# Patient Record
Sex: FEMALE | Race: WHITE | NOT HISPANIC OR LATINO | URBAN - METROPOLITAN AREA
[De-identification: names, ages, dates, MRNs, and addresses within clinical notes are randomized per-mention and may not be internally consistent; named-entity substitution may affect disease eponyms.]

---

## 2018-01-01 ENCOUNTER — HOSPITAL ENCOUNTER (INPATIENT)
Facility: MEDICAL CENTER | Age: 73
LOS: 2 days | DRG: 442 | End: 2018-04-29
Attending: EMERGENCY MEDICINE | Admitting: HOSPITALIST
Payer: MEDICARE

## 2018-01-01 ENCOUNTER — APPOINTMENT (OUTPATIENT)
Dept: RADIOLOGY | Facility: MEDICAL CENTER | Age: 73
DRG: 442 | End: 2018-01-01
Attending: EMERGENCY MEDICINE
Payer: MEDICARE

## 2018-01-01 VITALS
BODY MASS INDEX: 26.68 KG/M2 | TEMPERATURE: 97.2 F | HEART RATE: 64 BPM | RESPIRATION RATE: 22 BRPM | SYSTOLIC BLOOD PRESSURE: 75 MMHG | HEIGHT: 66 IN | OXYGEN SATURATION: 97 % | WEIGHT: 166.01 LBS | DIASTOLIC BLOOD PRESSURE: 47 MMHG

## 2018-01-01 DIAGNOSIS — N17.9 ACUTE RENAL FAILURE, UNSPECIFIED ACUTE RENAL FAILURE TYPE (HCC): ICD-10-CM

## 2018-01-01 DIAGNOSIS — R17 JAUNDICE: ICD-10-CM

## 2018-01-01 LAB
AFP-TM SERPL-MCNC: 4 NG/ML (ref 0–9)
ALBUMIN SERPL BCP-MCNC: 1.5 G/DL (ref 3.2–4.9)
ALBUMIN SERPL BCP-MCNC: 1.5 G/DL (ref 3.2–4.9)
ALBUMIN SERPL BCP-MCNC: 1.6 G/DL (ref 3.2–4.9)
ALBUMIN/GLOB SERPL: 0.3 G/DL
ALP SERPL-CCNC: 485 U/L (ref 30–99)
ALP SERPL-CCNC: 498 U/L (ref 30–99)
ALP SERPL-CCNC: 548 U/L (ref 30–99)
ALT SERPL-CCNC: 53 U/L (ref 2–50)
ALT SERPL-CCNC: 63 U/L (ref 2–50)
ALT SERPL-CCNC: 74 U/L (ref 2–50)
ANION GAP SERPL CALC-SCNC: 20 MMOL/L (ref 0–11.9)
APPEARANCE UR: ABNORMAL
AST SERPL-CCNC: 138 U/L (ref 12–45)
AST SERPL-CCNC: 151 U/L (ref 12–45)
AST SERPL-CCNC: 185 U/L (ref 12–45)
BACTERIA #/AREA URNS HPF: ABNORMAL /HPF
BASOPHILS # BLD AUTO: 0.1 % (ref 0–1.8)
BASOPHILS # BLD AUTO: 0.2 % (ref 0–1.8)
BASOPHILS # BLD AUTO: 0.2 % (ref 0–1.8)
BASOPHILS # BLD: 0.03 K/UL (ref 0–0.12)
BASOPHILS # BLD: 0.04 K/UL (ref 0–0.12)
BASOPHILS # BLD: 0.06 K/UL (ref 0–0.12)
BILIRUB SERPL-MCNC: 25.7 MG/DL (ref 0.1–1.5)
BILIRUB SERPL-MCNC: 27.2 MG/DL (ref 0.1–1.5)
BILIRUB SERPL-MCNC: 27.5 MG/DL (ref 0.1–1.5)
BUN SERPL-MCNC: 102 MG/DL (ref 8–22)
BUN SERPL-MCNC: 79 MG/DL (ref 8–22)
BUN SERPL-MCNC: 87 MG/DL (ref 8–22)
CALCIUM SERPL-MCNC: 7.1 MG/DL (ref 8.4–10.2)
CALCIUM SERPL-MCNC: 7.2 MG/DL (ref 8.4–10.2)
CALCIUM SERPL-MCNC: 7.9 MG/DL (ref 8.4–10.2)
CASTS 1761B: ABNORMAL /LPF
CASTS URNS QL MICRO: ABNORMAL /LPF
CHLORIDE SERPL-SCNC: 87 MMOL/L (ref 96–112)
CHLORIDE SERPL-SCNC: 89 MMOL/L (ref 96–112)
CHLORIDE SERPL-SCNC: 90 MMOL/L (ref 96–112)
CO2 SERPL-SCNC: 11 MMOL/L (ref 20–33)
CO2 SERPL-SCNC: 11 MMOL/L (ref 20–33)
CO2 SERPL-SCNC: 12 MMOL/L (ref 20–33)
COLOR UR: ABNORMAL
CREAT SERPL-MCNC: 6.25 MG/DL (ref 0.5–1.4)
CREAT SERPL-MCNC: 6.92 MG/DL (ref 0.5–1.4)
CREAT SERPL-MCNC: 7.96 MG/DL (ref 0.5–1.4)
CREAT UR-MCNC: 192.8 MG/DL
CRYSTALS 1718B: ABNORMAL /HPF
EKG IMPRESSION: NORMAL
EOSINOPHIL # BLD AUTO: 0.01 K/UL (ref 0–0.51)
EOSINOPHIL # BLD AUTO: 0.02 K/UL (ref 0–0.51)
EOSINOPHIL # BLD AUTO: 0.03 K/UL (ref 0–0.51)
EOSINOPHIL NFR BLD: 0 % (ref 0–6.9)
EOSINOPHIL NFR BLD: 0.1 % (ref 0–6.9)
EOSINOPHIL NFR BLD: 0.1 % (ref 0–6.9)
EPI CELLS #/AREA URNS HPF: ABNORMAL /HPF
ERYTHROCYTE [DISTWIDTH] IN BLOOD BY AUTOMATED COUNT: 51.8 FL (ref 35.9–50)
ERYTHROCYTE [DISTWIDTH] IN BLOOD BY AUTOMATED COUNT: 53.3 FL (ref 35.9–50)
ERYTHROCYTE [DISTWIDTH] IN BLOOD BY AUTOMATED COUNT: 54 FL (ref 35.9–50)
ETHANOL BLD-MCNC: 0 G/DL
GLOBULIN SER CALC-MCNC: 4.4 G/DL (ref 1.9–3.5)
GLOBULIN SER CALC-MCNC: 4.6 G/DL (ref 1.9–3.5)
GLOBULIN SER CALC-MCNC: 4.9 G/DL (ref 1.9–3.5)
GLUCOSE SERPL-MCNC: 137 MG/DL (ref 65–99)
GLUCOSE SERPL-MCNC: 168 MG/DL (ref 65–99)
GLUCOSE SERPL-MCNC: 173 MG/DL (ref 65–99)
HAV IGM SERPL QL IA: NEGATIVE
HBV CORE IGM SER QL: NEGATIVE
HBV SURFACE AG SER QL: NEGATIVE
HCT VFR BLD AUTO: 42.1 % (ref 37–47)
HCT VFR BLD AUTO: 43.2 % (ref 37–47)
HCT VFR BLD AUTO: 43.9 % (ref 37–47)
HCV AB SER QL: NEGATIVE
HGB BLD-MCNC: 14.6 G/DL (ref 12–16)
HGB BLD-MCNC: 15.1 G/DL (ref 12–16)
HGB BLD-MCNC: 15.6 G/DL (ref 12–16)
IMM GRANULOCYTES # BLD AUTO: 0.29 K/UL (ref 0–0.11)
IMM GRANULOCYTES # BLD AUTO: 0.36 K/UL (ref 0–0.11)
IMM GRANULOCYTES # BLD AUTO: 0.65 K/UL (ref 0–0.11)
IMM GRANULOCYTES NFR BLD AUTO: 1.4 % (ref 0–0.9)
IMM GRANULOCYTES NFR BLD AUTO: 1.7 % (ref 0–0.9)
IMM GRANULOCYTES NFR BLD AUTO: 2.6 % (ref 0–0.9)
INR PPP: 2.48 (ref 0.87–1.13)
LIPASE SERPL-CCNC: 77 U/L (ref 7–58)
LYMPHOCYTES # BLD AUTO: 1.72 K/UL (ref 1–4.8)
LYMPHOCYTES # BLD AUTO: 1.78 K/UL (ref 1–4.8)
LYMPHOCYTES # BLD AUTO: 1.93 K/UL (ref 1–4.8)
LYMPHOCYTES NFR BLD: 7.8 % (ref 22–41)
LYMPHOCYTES NFR BLD: 8.2 % (ref 22–41)
LYMPHOCYTES NFR BLD: 8.2 % (ref 22–41)
MAGNESIUM SERPL-MCNC: 2.3 MG/DL (ref 1.5–2.5)
MCH RBC QN AUTO: 26.8 PG (ref 27–33)
MCH RBC QN AUTO: 27.3 PG (ref 27–33)
MCH RBC QN AUTO: 27.3 PG (ref 27–33)
MCHC RBC AUTO-ENTMCNC: 34.7 G/DL (ref 33.6–35)
MCHC RBC AUTO-ENTMCNC: 35 G/DL (ref 33.6–35)
MCHC RBC AUTO-ENTMCNC: 35.5 G/DL (ref 33.6–35)
MCV RBC AUTO: 76.7 FL (ref 81.4–97.8)
MCV RBC AUTO: 77.2 FL (ref 81.4–97.8)
MCV RBC AUTO: 78 FL (ref 81.4–97.8)
MONOCYTES # BLD AUTO: 0.93 K/UL (ref 0–0.85)
MONOCYTES # BLD AUTO: 1.06 K/UL (ref 0–0.85)
MONOCYTES # BLD AUTO: 1.11 K/UL (ref 0–0.85)
MONOCYTES NFR BLD AUTO: 4.3 % (ref 0–13.4)
MONOCYTES NFR BLD AUTO: 4.4 % (ref 0–13.4)
MONOCYTES NFR BLD AUTO: 5.1 % (ref 0–13.4)
MUCOUS THREADS #/AREA URNS HPF: ABNORMAL /HPF
NEUTROPHILS # BLD AUTO: 18.01 K/UL (ref 2–7.15)
NEUTROPHILS # BLD AUTO: 18.29 K/UL (ref 2–7.15)
NEUTROPHILS # BLD AUTO: 21.17 K/UL (ref 2–7.15)
NEUTROPHILS NFR BLD: 84.7 % (ref 44–72)
NEUTROPHILS NFR BLD: 85.1 % (ref 44–72)
NEUTROPHILS NFR BLD: 85.8 % (ref 44–72)
NRBC # BLD AUTO: 0 K/UL
NRBC BLD-RTO: 0 /100 WBC
PHOSPHATE SERPL-MCNC: 11.1 MG/DL (ref 2.5–4.5)
PLATELET # BLD AUTO: 335 K/UL (ref 164–446)
PLATELET # BLD AUTO: 339 K/UL (ref 164–446)
PLATELET # BLD AUTO: 356 K/UL (ref 164–446)
PMV BLD AUTO: 9.2 FL (ref 9–12.9)
PMV BLD AUTO: 9.4 FL (ref 9–12.9)
PMV BLD AUTO: 9.5 FL (ref 9–12.9)
POTASSIUM SERPL-SCNC: 3.6 MMOL/L (ref 3.6–5.5)
POTASSIUM SERPL-SCNC: 3.8 MMOL/L (ref 3.6–5.5)
POTASSIUM SERPL-SCNC: 4.4 MMOL/L (ref 3.6–5.5)
PROT SERPL-MCNC: 5.9 G/DL (ref 6–8.2)
PROT SERPL-MCNC: 6.1 G/DL (ref 6–8.2)
PROT SERPL-MCNC: 6.5 G/DL (ref 6–8.2)
PROT UR-MCNC: 187.7 MG/DL (ref 0–15)
PROTHROMBIN TIME: 26.5 SEC (ref 12–14.6)
RBC # BLD AUTO: 5.45 M/UL (ref 4.2–5.4)
RBC # BLD AUTO: 5.54 M/UL (ref 4.2–5.4)
RBC # BLD AUTO: 5.72 M/UL (ref 4.2–5.4)
SODIUM SERPL-SCNC: 119 MMOL/L (ref 135–145)
SODIUM SERPL-SCNC: 120 MMOL/L (ref 135–145)
SODIUM SERPL-SCNC: 121 MMOL/L (ref 135–145)
SODIUM UR-SCNC: 16 MMOL/L
SP GR UR REFRACTOMETRY: 1.02
UNIDENT CRYS URNS QL MICRO: ABNORMAL /HPF
URATE SERPL-MCNC: 7.3 MG/DL (ref 1.9–8.2)
WBC # BLD AUTO: 21 K/UL (ref 4.8–10.8)
WBC # BLD AUTO: 21.6 K/UL (ref 4.8–10.8)
WBC # BLD AUTO: 24.9 K/UL (ref 4.8–10.8)

## 2018-01-01 PROCEDURE — 51701 INSERT BLADDER CATHETER: CPT

## 2018-01-01 PROCEDURE — 82105 ALPHA-FETOPROTEIN SERUM: CPT

## 2018-01-01 PROCEDURE — 83735 ASSAY OF MAGNESIUM: CPT

## 2018-01-01 PROCEDURE — 85025 COMPLETE CBC W/AUTO DIFF WBC: CPT

## 2018-01-01 PROCEDURE — 81001 URINALYSIS AUTO W/SCOPE: CPT

## 2018-01-01 PROCEDURE — 99223 1ST HOSP IP/OBS HIGH 75: CPT | Mod: AI | Performed by: HOSPITALIST

## 2018-01-01 PROCEDURE — 80053 COMPREHEN METABOLIC PANEL: CPT

## 2018-01-01 PROCEDURE — 770006 HCHG ROOM/CARE - MED/SURG/GYN SEMI*

## 2018-01-01 PROCEDURE — 80307 DRUG TEST PRSMV CHEM ANLYZR: CPT

## 2018-01-01 PROCEDURE — 74176 CT ABD & PELVIS W/O CONTRAST: CPT

## 2018-01-01 PROCEDURE — 83690 ASSAY OF LIPASE: CPT

## 2018-01-01 PROCEDURE — 85610 PROTHROMBIN TIME: CPT

## 2018-01-01 PROCEDURE — 71045 X-RAY EXAM CHEST 1 VIEW: CPT

## 2018-01-01 PROCEDURE — 84100 ASSAY OF PHOSPHORUS: CPT

## 2018-01-01 PROCEDURE — 76700 US EXAM ABDOM COMPLETE: CPT

## 2018-01-01 PROCEDURE — 99233 SBSQ HOSP IP/OBS HIGH 50: CPT | Performed by: HOSPITALIST

## 2018-01-01 PROCEDURE — 80074 ACUTE HEPATITIS PANEL: CPT

## 2018-01-01 PROCEDURE — 700105 HCHG RX REV CODE 258: Performed by: HOSPITALIST

## 2018-01-01 PROCEDURE — 96360 HYDRATION IV INFUSION INIT: CPT

## 2018-01-01 PROCEDURE — 36415 COLL VENOUS BLD VENIPUNCTURE: CPT

## 2018-01-01 PROCEDURE — 700102 HCHG RX REV CODE 250 W/ 637 OVERRIDE(OP): Performed by: INTERNAL MEDICINE

## 2018-01-01 PROCEDURE — A9270 NON-COVERED ITEM OR SERVICE: HCPCS | Performed by: INTERNAL MEDICINE

## 2018-01-01 PROCEDURE — 82570 ASSAY OF URINE CREATININE: CPT

## 2018-01-01 PROCEDURE — 84156 ASSAY OF PROTEIN URINE: CPT

## 2018-01-01 PROCEDURE — 770001 HCHG ROOM/CARE - MED/SURG/GYN PRIV*

## 2018-01-01 PROCEDURE — 99285 EMERGENCY DEPT VISIT HI MDM: CPT

## 2018-01-01 PROCEDURE — 84300 ASSAY OF URINE SODIUM: CPT

## 2018-01-01 PROCEDURE — 93005 ELECTROCARDIOGRAM TRACING: CPT | Performed by: EMERGENCY MEDICINE

## 2018-01-01 PROCEDURE — 84550 ASSAY OF BLOOD/URIC ACID: CPT

## 2018-01-01 PROCEDURE — 700105 HCHG RX REV CODE 258: Performed by: EMERGENCY MEDICINE

## 2018-01-01 RX ORDER — BISACODYL 10 MG
10 SUPPOSITORY, RECTAL RECTAL
Status: DISCONTINUED | OUTPATIENT
Start: 2018-01-01 | End: 2018-04-30 | Stop reason: HOSPADM

## 2018-01-01 RX ORDER — ONDANSETRON 4 MG/1
4 TABLET, ORALLY DISINTEGRATING ORAL EVERY 4 HOURS PRN
Status: DISCONTINUED | OUTPATIENT
Start: 2018-01-01 | End: 2018-04-30 | Stop reason: HOSPADM

## 2018-01-01 RX ORDER — AMOXICILLIN 250 MG/1
250 CAPSULE ORAL 3 TIMES DAILY
COMMUNITY

## 2018-01-01 RX ORDER — AMOXICILLIN 250 MG
2 CAPSULE ORAL 2 TIMES DAILY
Status: DISCONTINUED | OUTPATIENT
Start: 2018-01-01 | End: 2018-04-30 | Stop reason: HOSPADM

## 2018-01-01 RX ORDER — SODIUM CHLORIDE 9 MG/ML
INJECTION, SOLUTION INTRAVENOUS CONTINUOUS
Status: DISCONTINUED | OUTPATIENT
Start: 2018-01-01 | End: 2018-04-30 | Stop reason: HOSPADM

## 2018-01-01 RX ORDER — POLYETHYLENE GLYCOL 3350 17 G/17G
1 POWDER, FOR SOLUTION ORAL
Status: DISCONTINUED | OUTPATIENT
Start: 2018-01-01 | End: 2018-04-30 | Stop reason: HOSPADM

## 2018-01-01 RX ORDER — ONDANSETRON 2 MG/ML
4 INJECTION INTRAMUSCULAR; INTRAVENOUS EVERY 4 HOURS PRN
Status: DISCONTINUED | OUTPATIENT
Start: 2018-01-01 | End: 2018-04-30 | Stop reason: HOSPADM

## 2018-01-01 RX ORDER — ZOLPIDEM TARTRATE 5 MG/1
5 TABLET ORAL ONCE
Status: COMPLETED | OUTPATIENT
Start: 2018-01-01 | End: 2018-01-01

## 2018-01-01 RX ORDER — SODIUM CHLORIDE 9 MG/ML
1000 INJECTION, SOLUTION INTRAVENOUS ONCE
Status: COMPLETED | OUTPATIENT
Start: 2018-01-01 | End: 2018-01-01

## 2018-01-01 RX ADMIN — ZOLPIDEM TARTRATE 5 MG: 5 TABLET ORAL at 23:20

## 2018-01-01 RX ADMIN — SODIUM CHLORIDE 1000 ML: 9 INJECTION, SOLUTION INTRAVENOUS at 09:59

## 2018-01-01 RX ADMIN — SODIUM CHLORIDE: 9 INJECTION, SOLUTION INTRAVENOUS at 09:15

## 2018-01-01 RX ADMIN — SODIUM CHLORIDE: 9 INJECTION, SOLUTION INTRAVENOUS at 22:49

## 2018-01-01 RX ADMIN — SODIUM CHLORIDE: 9 INJECTION, SOLUTION INTRAVENOUS at 12:28

## 2018-01-01 RX ADMIN — SODIUM CHLORIDE: 9 INJECTION, SOLUTION INTRAVENOUS at 05:27

## 2018-01-01 ASSESSMENT — PATIENT HEALTH QUESTIONNAIRE - PHQ9
SUM OF ALL RESPONSES TO PHQ9 QUESTIONS 1 AND 2: 0
1. LITTLE INTEREST OR PLEASURE IN DOING THINGS: NOT AT ALL
2. FEELING DOWN, DEPRESSED, IRRITABLE, OR HOPELESS: NOT AT ALL
1. LITTLE INTEREST OR PLEASURE IN DOING THINGS: NOT AT ALL
2. FEELING DOWN, DEPRESSED, IRRITABLE, OR HOPELESS: NOT AT ALL
SUM OF ALL RESPONSES TO PHQ9 QUESTIONS 1 AND 2: 0

## 2018-01-01 ASSESSMENT — PAIN SCALES - WONG BAKER
WONGBAKER_NUMERICALRESPONSE: DOESN'T HURT AT ALL

## 2018-01-01 ASSESSMENT — LIFESTYLE VARIABLES
ALCOHOL_USE: NO
EVER_SMOKED: NEVER

## 2018-01-01 ASSESSMENT — PAIN SCALES - GENERAL
PAINLEVEL_OUTOF10: 0

## 2018-01-01 ASSESSMENT — ENCOUNTER SYMPTOMS
WEAKNESS: 1
SHORTNESS OF BREATH: 1

## 2018-04-27 PROBLEM — E87.1 HYPONATREMIA: Status: ACTIVE | Noted: 2018-01-01

## 2018-04-27 PROBLEM — R16.0 LIVER MASS: Status: ACTIVE | Noted: 2018-01-01

## 2018-04-27 PROBLEM — D68.9 COAGULOPATHY (HCC): Status: ACTIVE | Noted: 2018-01-01

## 2018-04-27 PROBLEM — E87.29 INCREASED ANION GAP METABOLIC ACIDOSIS: Status: ACTIVE | Noted: 2018-01-01

## 2018-04-27 PROBLEM — R74.01 TRANSAMINITIS: Status: ACTIVE | Noted: 2018-01-01

## 2018-04-27 PROBLEM — N17.9 ARF (ACUTE RENAL FAILURE) (HCC): Status: ACTIVE | Noted: 2018-01-01

## 2018-04-27 PROBLEM — K74.60 LIVER CIRRHOSIS (HCC): Status: ACTIVE | Noted: 2018-01-01

## 2018-04-27 NOTE — H&P
DATE OF ADMISSION:  04/27/2018    Patient does not have a PCP.    CHIEF COMPLAINT:  Malaise and fatigue, yellow discoloration.    HISTORY OF PRESENT ILLNESS:  Patient is a 72-year-old female that has a   history of osteosarcoma, status post left-sided upper extremity amputation   over 40 years ago.  She presents to the hospital complaining of worsening of   fatigue, shortness of breath, and decreased appetite for the past 4-5 days.    She states that her symptoms have been progressively worsening.  Today, her    noted that she was quite yellow.  In the emergency department, she was   found to have a liver mass, renal failure as well as a total bilirubin of   over 27.  Upon further discussion with the patient and her , they are   unable to tell me when her yellowing of her skin and her eyes began.  Her    reports that the lighting in the house is different and they have not   left the house in quite a while, and therefore, he is unable to tell her skin   color.  Nonetheless, she is extremely jaundiced in the emergency department.    She reports that she had urinary tract symptoms about a week ago and her   friend recommended amoxicillin.  She did not see a physician.  She went and   bought over the encounter amoxicillin 250 mg at a pet store.  She states that   she was taking 250 mg 2 tablets twice daily, which amounts to 1000 mg daily   for about 4-5 days and she felt that her symptoms were better, but not   improving quick enough; therefore, she decided to take approximately 25 of   these 250 mg tablets to speed up the process.  She feels that her symptoms   began shortly thereafter.  She has no other complaints at this time.  She   denies any urinary symptoms, but she does state that she has had decreased   appetite and hardly anything oral over the past few days.    REVIEW OF SYSTEMS:  As per HPI.  All other systems have been reviewed and are   negative.    ALLERGIES:  No known drug  allergies.    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    SOCIAL HISTORY:  She denies tobacco, drugs, or alcohol use.    HOME MEDICATIONS:  None.    FAMILY HISTORY:  Has been reviewed, is significant for synovial cell carcinoma   in her grandmother.    PHYSICAL EXAMINATION:  VITAL SIGNS:  Blood pressure 128/57, pulse of 81, respirations 16, temperature   35.8, oxygen saturation 96% on room air, weight 75.3 kilograms.  GENERAL:  Patient appears to be extremely jaundiced and chronically ill,   currently not in any acute distress.  HEENT:  Very dry mucous membranes.  No oropharyngeal exudates.  Eyes:  EOMI,   PERRLA.  She has marked scleral icterus.  NECK:  No lymphadenopathy, no JVD.  CARDIOVASCULAR:  Regular rate and rhythm.  Positive murmur.  LUNGS:  Clear to auscultation bilaterally.  No rales, rhonchi, or wheezes.  ABDOMEN:  Positive bowel sounds, soft, nondistended.  She has a positive fluid   wave.  EXTREMITIES:  No clubbing or cyanosis.  NEUROLOGICAL:  She is awake, lethargic, oriented to person, place, time, and   situation.  SKIN:  She is very jaundiced.  MUSCULOSKELETAL:  She has left upper extremity amputation.    LABORATORY DATA:  WBC 21, hemoglobin 15.6, hematocrit 43.9, platelets 356.    Sodium 119, potassium 3.8, chloride 87, bicarbonate is 12, anion gap of 20,   glucose 173, BUN 79, creatinine 6.25, calcium 7.9, , ALT 53, alkaline   phosphatase 485, total bilirubin 27.5, albumin 1.6.  INR 2.48.    IMAGING:  Abdominal ultrasound shows cirrhotic liver, a 38a26m3 mm hyperechoic   solid mass in the left lobe of the liver.  Common bile duct is 9 mm.  Portal   vein and inferior vena cava are normal.  CT of her abdomen shows cirrhotic   liver, ill-defined mass in the right and left lobes, ascites and splenomegaly,   portal hypertension.    ASSESSMENT AND PLAN:  Patient is a 72-year-old female that presents to the   hospital  with jaundice and weakness.  1.  Liver mass.  This is concerning for an obvious malignancy.  Most likely,   this is consistent with cholangiocarcinoma and less likely hepatocellular   carcinoma.  I did discuss the findings including imaging and labs extensively   with Dr. Flores with surgical oncology as well as Dr. Caballero with   oncology.  At this point, the patient is not a surgical candidate, especially   given the size.  Furthermore, given her renal function and her extremely   dilated total bilirubin, it is felt that she is not a candidate for   chemotherapy either.  Recommendations were made for hospice care.  I did   discuss this with the patient and at this point, she would like to proceed   with just IV fluids and treat her renal function and then once she treats   that, she would like another opinion.  She is not interested in hospice at   this time.  I did discuss transferring her over to [facility], so she can   have an oncology consultation there, but at this point, the patient would like   to be admitted here and refuses to be transferred there, at least for now   until she can receive some fluids and have her labs including her renal   function monitored closely.  I also discussed possibly doing a biopsy, but   oncology at this time feel that this is warranted at this time.  Alpha   fetoprotein levels are pending.  2.  Liver cirrhosis with transaminitis.  This is secondary to above.  There is   also some concern due to the amount of amoxicillin that she had taken.  We   will continue to monitor her labs closely and we will discuss this further   with nephrology.  3.  Acute renal failure with increased anion gap metabolic acidosis as well as   hyponatremia.  Nephrology is going to continue to follow alongside.  We will   continue with IV fluids and monitor her renal function closely.  We will avoid   any nephrotoxic drugs for now.  4.  Coagulopathy.  This is secondary to her liver failure.  5.   Severe protein-calorie malnutrition.  I have consulted nutrition for   further recommendations.  We will follow.  6.  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  7.  Systemic inflammatory response syndrome.  Currently, I do not see any   obvious source of infection, so I am going to hold off on antibiotics for now,   and she also did take quite a bit of amoxicillin just recently.  8.  She wishes to remain full code.  9.  Deep venous thrombosis prophylaxis, bilateral sequential compression   devices.  I anticipate that the patient will need to be in the hospital for at least 2 midnights in order to treat the above-mentioned medical conditions       ____________________________________     MD JUAN Norris / BILLY    DD:  04/27/2018 13:48:19  DT:  04/27/2018 14:21:18    D#:  [id number]  Job#:  [id number]

## 2018-04-27 NOTE — CONSULTS
Consults   Nephrology Inpatient Consultation    Date of Service  4/27/2018    Reason for Consultation  ANAHI, hyponatremia    History of Presenting Illness  72 y.o. female admitted 4/27/2018. She has a history of cancer in the 1970, and apparently has not seen a physician 40 years ago. She states she was in her usual state of health until about a week ago. In fact, she states she was furniture shopping a week ago. For the last 3 days, however, she has been feeling ill and basically not been eating or drinking anything but a small amount of tea. The patient was advised to seek medical attention by medics. The patient clearly has jaundice, SOB, anorexia which is all apparently new. She denies any NSAID use or any problems urinating except for recently. She came in with severe hyponatremia, what appears to be volume depletion, acidosis, and ANAHI with Cr of 6.25. The patient does have a dry mouth. She has Tbili elevation with low albumin and elevated alkphos. Abd US has the appearance of cirrhosis, with a mass in the L lobe of the liver and what appears to be obstruction with dilated CBD. CT scan seems to correlate with ? Of HCC vs cholangiocarcinoma.     She denies any change in abdominal girth, or any feeling of fluid in her abdomen.     Referring Physician  Brody Bob M.D.    Consulting Physician  Richie Trejo M.D.    Review of Systems  Review of Systems   Constitutional: Positive for malaise/fatigue.   Respiratory: Positive for shortness of breath.    Neurological: Positive for weakness.      Past Medical History  No past medical history on file.    Surgical History  No past surgical history on file.    Medications  No current facility-administered medications on file prior to encounter.      No current outpatient prescriptions on file prior to encounter.       Family History  family history is not on file.    Social History  Social History   Substance Use Topics   • Smoking status: Not on file   • Smokeless  tobacco: Not on file   • Alcohol use Not on file       Allergies  No Known Allergies     Physical Exam  Laboratory/Imaging   Hemodynamics  Temp (24hrs), Av.8 °C (96.5 °F), Min:35.8 °C (96.5 °F), Max:35.8 °C (96.5 °F)   Temperature: 35.8 °C (96.5 °F)  Pulse  Av  Min: 77  Max: 81 Heart Rate (Monitored): 79  Blood Pressure : 120/57, NIBP: 117/63      Respiratory      Respiration: (!) 25, Pulse Oximetry: 97 %             Fluids       Nutrition  Orders Placed This Encounter   Procedures   • Diet Order     Standing Status:   Standing     Number of Occurrences:   1     Order Specific Question:   Diet:     Answer:   Regular [1]       Physical Exam   Constitutional: She is oriented to person, place, and time. She appears ill.   HENT:   Head: Normocephalic and atraumatic.   Dry mouth   Cardiovascular: Normal rate and regular rhythm.  Exam reveals no friction rub.    No murmur heard.  Pulmonary/Chest: Effort normal and breath sounds normal. No respiratory distress. She has no wheezes.   Abdominal: She exhibits distension. There is no tenderness.   soft   Musculoskeletal: She exhibits no edema or tenderness.   Neurological: She is alert and oriented to person, place, and time.   Skin: Skin is warm and dry.   Psychiatric: She has a normal mood and affect. Her behavior is normal.       Recent Labs      18   0851   WBC  21.0*   RBC  5.72*   HEMOGLOBIN  15.6   HEMATOCRIT  43.9   MCV  76.7*   MCH  27.3   MCHC  35.5*   RDW  51.8*   PLATELETCT  356   MPV  9.2     Recent Labs      18   0851   SODIUM  119*   POTASSIUM  3.8   CHLORIDE  87*   CO2  12*   GLUCOSE  173*   BUN  79*   CREATININE  6.25*   CALCIUM  7.9*     Recent Labs      18   0851   INR  2.48*                  Assessment/Plan     1. ANAHI - The patient gives a history of feeling well until about a week ago. Given her overall appearance, I suspect this has been for much longer. She clearly has had poor intake for the last 3 days, and there is likely  some component of volume depletion. I would not expect these metabolic derangements, however, from volume depletion alone. The most likely differential would include hepatorenal syndrome or ATN.  She does appear to have granular casts, so GN is also a possibility. Will check labs.  2. Hyponatremia - This appears to be hypovolemic hyponatremia. Certainly there could be a component of liver disease as well. Will check labs and start low dose IVF.   3. Acidosis - suspect from kidney disease  4. Liver mass/Cirrhosis - suspect malignancy  5. Obstructive jaundice from ?mass    -From a renal standpoint, I would start with checking labs and starting on low dose IVF  -In addition to the usual, would check Uric Acid and urine protein studies.  -Given the advanced nature of her presentation, if her renal function does not stabilize with fluids alone, would need to discuss the benefit vs risk of any form of RRT  -d/w Dr. Bob

## 2018-04-27 NOTE — ED NOTES
Chief Complaint   Patient presents with   • Jaundice     since today, advised by Medics   • Shortness of Breath     since today, no chest pain   • UTI     resolved per pt history, took Fish Amoxil for 1 week   Amoxcillin stopped 2 days ago

## 2018-04-27 NOTE — ED NOTES
Med rec complete per patient  Allergies reviewed    Patient is taking Fish Amox from pet store, was taking 3 times daily and was feeling better but then started to feel worse and took 12 Fish Amox in 1 day on 4-25-18

## 2018-04-27 NOTE — ED PROVIDER NOTES
"ED Provider Note    CHIEF COMPLAINT  Chief Complaint   Patient presents with   • Jaundice     since today, advised by Medics   • Shortness of Breath     since today, no chest pain   • UTI     resolved per pt history, took Fish Amoxil for 1 week       HPI  Andrae Andrew is a 72 y.o. female who presents for generalized weakness worsening over the last 4 days associated with shortness of breath primarily with exertion which is severe. Denies orthopnea although she has trouble breathing at night. Has not seen a physician in 40 years. No known medical problems except for a synovial carcinoma or sarcoma in the 1970s resulting in left arm amputation. Denies hepatitis or alcohol use. Patient did not realize she was jaundiced until paramedics said so this morning. Patient suspected she had a UTI this past 7-10 days and bought amoxicillin for fish at a pet store and took it.    REVIEW OF SYSTEMS  Pertinent positives include: Generalized weakness, dyspnea on exertion, loss of appetite.  Pertinent negatives include: Fever, vomiting, diarrhea, abdominal pain, dysuria, urgency, frequency, swelling, immune compromise, headache, cough.  10+ systems reviewed and negative.      PAST MEDICAL HISTORY  Synovial cell cancer left arm status post amputation    SOCIAL HISTORY  Denies tobacco or alcohol use    SURGICAL HISTORY  Left arm amputation    CURRENT MEDICATIONS  Denies    ALLERGIES  Not on File    PHYSICAL EXAM  VITAL SIGNS: /57   Pulse 81   Temp 35.8 °C (96.5 °F)   Resp 16   Ht 1.676 m (5' 6\")   Wt 75.3 kg (166 lb)   SpO2 96%   BMI 26.79 kg/m²   Reviewed and borderline blood pressure elevation, afebrile  Constitutional: Well developed, Well nourished, moderately obese, extremely jaundiced.  HENT: Normocephalic, atraumatic, bilateral external ears normal, oropharynx moist, No exudates or erythema.   Eyes: PERRLA, conjunctiva pink, marked scleral icterus.   Cardiovascular: Regular S1-S2 with a 3/6 systolic murmur heard " best at the left sternal border but no dependent edema or calf cords, no JVD or bruit.  Respiratory: Tachypneic but no rales, rhonchi, wheeze, cough.  Gastrointestinal: Soft, distended, nontender, no masses, positive fluid wave, bowel sounds normal.  Skin: Severely jaundiced.   Genitourinary:  No costovertebral angle tenderness.   Neurologic: Alert & oriented x 3, cranial nerves 2-12 intact by passive exam.  No focal deficit noted.  Psychiatric: Affect normal, Judgment normal, Mood normal.   Musculoskeletal: Amputation left arm at the shoulder    DIFFERENTIAL DIAGNOSIS:  Cancer, biliary obstruction, hepatorenal failure, post renal obstruction, dehydration, pyelonephritis, cholecystitis, choledocholithiasis, pancreatitis.    EKG  EKG Interpretation    Interpreted by me    Rhythm: normal sinus   Rate: normal at 81  Axis: normal  Ectopy: none  Conduction: normal  ST Segments: no acute change  T Waves: no acute change  Q Waves: none, poor septal R wave progression    Clinical Impression: Normal sinus rhythm with possible age indeterminate anterior septal ischemia    RADIOLOGY/PROCEDURES  CT-RENAL COLIC EVALUATION(A/P W/O)   Final Result      1.  Cirrhotic appearance of the liver. There are ill-defined mass is seen within the right and left lobes which are difficult to accurately measure. Consideration should be given for hepatocellular carcinoma.      2.  Gallbladder not definitely identified. This may represent contraction of the gallbladder. There is dilatation of the left intrahepatic bile ducts. Consideration should be given for central obstructing hepatocellular or cholangiocarcinoma.      3.  Ascites and splenomegaly consistent with portal hypertension.      4.  Enlarged periportal and portacaval lymph nodes.      5.  Bibasilar atelectasis.      US-ABDOMEN COMPLETE SURVEY   Final Result      1.  Gallbladder not identified. The patient gives no history of prior cholecystectomy. The common bile duct is dilated at 9  mm. If the patient has had cholecystectomy, this can be seen following cholecystectomy. In the absence of cholecystectomy,    consideration should be given for distal obstruction.      2.  Cirrhotic appearance of the liver.      3.  15 x 13 x 9 mm hypoechoic solid mass within the left lobe of the liver. Consideration should be given for hepatocellular carcinoma, metastatic disease, and focal fatty sparing.      4.  Ascites and splenomegaly consistent with portal hypertension.      DX-CHEST-LIMITED (1 VIEW)   Final Result      No evidence of acute cardiopulmonary process.          LABORATORY:  Results for orders placed or performed during the hospital encounter of 04/27/18   CBC WITH DIFFERENTIAL   Result Value Ref Range    WBC 21.0 (H) 4.8 - 10.8 K/uL    RBC 5.72 (H) 4.20 - 5.40 M/uL    Hemoglobin 15.6 12.0 - 16.0 g/dL    Hematocrit 43.9 37.0 - 47.0 %    MCV 76.7 (L) 81.4 - 97.8 fL    MCH 27.3 27.0 - 33.0 pg    MCHC 35.5 (H) 33.6 - 35.0 g/dL    RDW 51.8 (H) 35.9 - 50.0 fL    Platelet Count 356 164 - 446 K/uL    MPV 9.2 9.0 - 12.9 fL    Neutrophils-Polys 85.80 (H) 44.00 - 72.00 %    Lymphocytes 8.20 (L) 22.00 - 41.00 %    Monocytes 4.40 0.00 - 13.40 %    Eosinophils 0.10 0.00 - 6.90 %    Basophils 0.10 0.00 - 1.80 %    Immature Granulocytes 1.40 (H) 0.00 - 0.90 %    Nucleated RBC 0.00 /100 WBC    Neutrophils (Absolute) 18.01 (H) 2.00 - 7.15 K/uL    Lymphs (Absolute) 1.72 1.00 - 4.80 K/uL    Monos (Absolute) 0.93 (H) 0.00 - 0.85 K/uL    Eos (Absolute) 0.02 0.00 - 0.51 K/uL    Baso (Absolute) 0.03 0.00 - 0.12 K/uL    Immature Granulocytes (abs) 0.29 (H) 0.00 - 0.11 K/uL    NRBC (Absolute) 0.00 K/uL   COMP METABOLIC PANEL   Result Value Ref Range    Sodium 119 (LL) 135 - 145 mmol/L    Potassium 3.8 3.6 - 5.5 mmol/L    Chloride 87 (L) 96 - 112 mmol/L    Co2 12 (L) 20 - 33 mmol/L    Anion Gap 20.0 (H) 0.0 - 11.9    Glucose 173 (H) 65 - 99 mg/dL    Bun 79 (HH) 8 - 22 mg/dL    Creatinine 6.25 (HH) 0.50 - 1.40 mg/dL     Calcium 7.9 (L) 8.4 - 10.2 mg/dL    AST(SGOT) 138 (H) 12 - 45 U/L    ALT(SGPT) 53 (H) 2 - 50 U/L    Alkaline Phosphatase 485 (H) 30 - 99 U/L    Total Bilirubin 27.5 (H) 0.1 - 1.5 mg/dL    Albumin 1.6 (L) 3.2 - 4.9 g/dL    Total Protein 6.5 6.0 - 8.2 g/dL    Globulin 4.9 (H) 1.9 - 3.5 g/dL    A-G Ratio 0.3 g/dL   LIPASE   Result Value Ref Range    Lipase 77 (H) 7 - 58 U/L   PROTHROMBIN TIME   Result Value Ref Range    PT 26.5 (H) 12.0 - 14.6 sec    INR 2.48 (H) 0.87 - 1.13   DIAGNOSTIC ALCOHOL   Result Value Ref Range    Diagnostic Alcohol 0.00 0.00 g/dL   URINE MICROSCOPIC (W/UA)   Result Value Ref Range    Bacteria Few (A) None /hpf    Epithelial Cells Moderate (A) Few /hpf    Mucous Threads Few /hpf    Urine Crystals Mod Amorphous /hpf    Urine Crystals Mod Ca Oxalate /hpf    Urine Casts 0-2 Hyaline /lpf    Urine Casts 0-2 Granular /lpf   REFRACTOMETER SG   Result Value Ref Range    Specific Gravity 1.017        INTERVENTIONS: Dictations IV fluid suspected dehydration, acute renal failure, hyponatremia  Medications   NS infusion (not administered)   NS infusion 1,000 mL (0 mL Intravenous Stopped 4/27/18 1059)     Response: Improved hydration.    COURSE & MEDICAL DECISION MAKING  Ill-appearing patient presents with generalized weakness and jaundice. She has a hepatic mass that might be metastatic disease or hepatocellular carcinoma. She appears to have biliary obstruction and could have cholangiocarcinoma. She likely has renal failure secondary to hepatorenal syndrome. She has hyponatremia. Despite leukocytosis there is no evidence of source of infection. Case discussed with the hospitalist Dr. Bob and nephrologist Dr. Wolfe. Dr. Bob discussed the case with Dr. Flores surgery and Dr. Caballero, oncology who both recommended that the patient be made hospice as they believe there is no intervention possible. Both believe that she has cholangiocarcinoma.    PLAN:  Admission for IV fluids and treatment of  hyponatremia  Hospice placement    CONDITION: Near terminal.    FINAL IMPRESSION  1. Jaundice    2. Acute renal failure, unspecified acute renal failure type (CMS-HCC)    3.      Cholangiocarcinoma      Electronically signed by: Joel Archibald, 4/27/2018 8:39 AM

## 2018-04-28 PROBLEM — G93.40 ENCEPHALOPATHY ACUTE: Status: ACTIVE | Noted: 2018-01-01

## 2018-04-28 NOTE — PROGRESS NOTES
Alert and oriented  to person,place and time.Trace of edema BLE, on room air sat 97%. Denied pain at this time.Due medication given per MAR. States understanding of POC.  by bed side.

## 2018-04-28 NOTE — ASSESSMENT & PLAN NOTE
May be related to the liver mass  She also took quite a bit of amoxicillin approximately a week ago which may have worsened her liver function  Appears to have fulminant hepatic failure  Gastroenterology consultant

## 2018-04-28 NOTE — ASSESSMENT & PLAN NOTE
Nephrology following alongside  Consistent with hepatorenal syndrome  Continue with IV fluids  Minimal output  Renal function worsening

## 2018-04-28 NOTE — PROGRESS NOTES
Patient admitted to room 301-1, no s/s of pain, feels slightly SOB possible r/t to adominal fluid overload. She feels fatigue, with poor appetite, complaints of no urinating in 3 days, bridges was in place but no very little urine came out, bladder scanner was showing 600 plus, but possible it was reading the ascities instead of the bladder.

## 2018-04-28 NOTE — DISCHARGE PLANNING
Medical Social Work    Referral: Code BLue     Intervention: This writer responded to a code blue called for pt's room. Code Blue was called for family member who lying on the floor when this writer arrived. Pt's family member will be taking to Emergency room for evaluation. Pt appeared stable through out code blue process.     Plan: This writer will remain available for any questions or concerns.

## 2018-04-28 NOTE — ASSESSMENT & PLAN NOTE
Quite large  Consistent with cholangiocarcinoma  I did speak with Dr. EDOUARD with surgical oncology who does not feel that this she is a surgical candidate; he suggested hospice  I also spoke with Dr. Caballero with oncology who reports that she is not a candidate for radiation or chemotherapy. He also recommended hospice care  Both oncologist did suggest that this was consistent with a cholangiocarcinoma and less likely hepatocellular carcinoma

## 2018-04-28 NOTE — PROGRESS NOTES
ICU team responded to code blue.  Significant other found down in room.  Two compressions given to this male found down. RN Rosalba reporting no pulse prior to start of compressions.  When I entered the room the significant other was lying on the floor with his eyes open. Significant other was able to tell me year, current location, and his name.  He reported he was feeling dizzy, no chest pain, SOB, numbness or lack of sensation in extremities.  Vitals stable see code blue report.  ER nurse Cleo started IV in right arm.  Fluids started.  ER doc in room assessing patient.  Patient in stable condition taken down to ER for work up.

## 2018-04-28 NOTE — PROGRESS NOTES
Bedside report given to  Magdiel NEELY.Pt  Resting in the bed.Safety precautions in place and all the lines remain patent.

## 2018-04-28 NOTE — DIETARY
"Nutrition services: Day 1 of admit. Andrae Andrew is a 72 y.o. female with admitting DX of ARF, liver mass, coagulopathy, and cirrhosis.    The pt was seen today to interview for poor PO PTA per a consult received from the nutrition admit screening. Pt noted to have poor appetite and oral intake x4-5 days PTA d/t worsening fatigue and SOB. Per MD note, pt found to have obstructive jaundice s/p liver mass suspect for malignancy. Pt is not a candidate for transplant or chemotherapy at this time. Pt visited in her room this morning to see if she if there is any foods that sound good to her. Pt notably jaundiced, very disoriented and lethargic, unable to provide any food requests. Untouched breakfast tray noted at bedside. Will add a few protein-centered items to her meal trays for now to see if her orientation improves enough for PO. This writer feels that pt is currently too lethargic to take in adequate nutrition regardless of foods offered. May need to consider alternative mode of nutrition if within the pt's POC.    Assessment:  Height: 167 cm (5' 5.75\")  Weight: 75.3 kg (166 lb 0.1 oz)  Body mass index is 27 kg/m².  Diet/Intake: Regular, no PO records on file yet    Evaluation:   1. Labs: Sodium 120 (L, trend up), Anion gap 20 (H, stable), BUN 87 (H, trend up), Cr 6.92 (H, trend up), GFR 6 (L, trend down), Calcium 7.1 (L, adjusted = 9.1 WNL),  (H, trend up), ALT 63 (H, trend up), Alk Phos 498 (H, trend up), Tbili 27.2 (H, trend down), Albumin 1.5 (L, trend down), Phos 11.1 (H)  2. Meds: Pericolace, (PRN: Zofran, Miralax, MOM, Dulcolax)  3. Fluids: NS infusion @ 100mL/hr  4. Skin: Intact - no skin breakdown noted  5. GI: last BM 4/24 x2 - refusing scheduled bowel meds    Malnutrition Risk: Considering recent findings of hepatic mass, subsequent poor PO x4-5 days, and confusion, the pt is at risk for malnutrition.    Recommendations/Plan:  1. Continue current diet. Additional protein-centered meal items to " encourage PO intake   2. If PO intake continues to be minimal consider enteral nutrition support if within pt's POC  3. Encourage intake of meals  4. Document intake of all meals as % taken in ADL's to provide interdisciplinary communication across all shifts.   5. Monitor weight.  6. Nutrition rep will continue to see patient for ongoing meal and snack preferences.     RD monitoring.

## 2018-04-28 NOTE — PROGRESS NOTES
Hospital Medicine Progress Note, Adult, Complex               Author: Richie Trejo Date & Time created: 2018  8:51 AM     Interval History:  72 year old came in with jaundice and ANAHI and found to have liver mass. Dr. Bob had talked to surgery and oncology and recommendation is for hospice care. Cr is worse, and patient this AM is confused.     Review of Systems:  Review of Systems   Unable to perform ROS: Medical condition       Physical Exam:  Physical Exam   Constitutional: She appears lethargic. She appears ill.   Jaundiced   HENT:   Head: Normocephalic and atraumatic.   Cardiovascular: Normal rate and regular rhythm.    Pulmonary/Chest: Effort normal and breath sounds normal.   Abdominal: She exhibits distension. There is tenderness.   Neurological: She appears lethargic.   Wakes, but confused, oriented x 1 only   Skin: Skin is warm and dry.       Labs:        Invalid input(s): [id number]      Recent Labs      18   0851  18   0511   SODIUM  119*  120*   POTASSIUM  3.8  3.6   CHLORIDE  87*  89*   CO2  12*  11*   BUN  79*  87*   CREATININE  6.25*  6.92*   MAGNESIUM   --   2.3   PHOSPHORUS   --   11.1*   CALCIUM  7.9*  7.1*     Recent Labs      18   0851  18   0511   ALTSGPT  53*  63*   ASTSGOT  138*  151*   ALKPHOSPHAT  485*  498*   TBILIRUBIN  27.5*  27.2*   LIPASE  77*   --    GLUCOSE  173*  168*     Recent Labs      18   0851  18   0511   RBC  5.72*  5.54*   HEMOGLOBIN  15.6  15.1   HEMATOCRIT  43.9  43.2   PLATELETCT  356  339   PROTHROMBTM  26.5*   --    INR  2.48*   --      Recent Labs      18   0851  18   0511   WBC  21.0*  21.6*   NEUTSPOLYS  85.80*  84.70*   LYMPHOCYTES  8.20*  8.20*   MONOCYTES  4.40  5.10   EOSINOPHILS  0.10  0.10   BASOPHILS  0.10  0.20   ASTSGOT  138*  151*   ALTSGPT  53*  63*   ALKPHOSPHAT  485*  498*   TBILIRUBIN  27.5*  27.2*           Hemodynamics:  Temp (24hrs), Av.3 °C (97.4 °F), Min:36.2 °C (97.1 °F), Max:36.5  °C (97.7 °F)  Temperature: 36.2 °C (97.1 °F)  Pulse  Av.7  Min: 75  Max: 81Heart Rate (Monitored): 79  Blood Pressure : 110/54, NIBP: 117/63     Respiratory:    Respiration: 18, Pulse Oximetry: 97 %           Fluids:    Intake/Output Summary (Last 24 hours) at 18 0851  Last data filed at 18 1800   Gross per 24 hour   Intake                0 ml   Output                0 ml   Net                0 ml     Weight: 75.3 kg (166 lb 0.1 oz)  GI/Nutrition:  Orders Placed This Encounter   Procedures   • Diet Order     Standing Status:   Standing     Number of Occurrences:   1     Order Specific Question:   Diet:     Answer:   Regular [1]     Medical Decision Making, by Problem:  Active Hospital Problems    Diagnosis   • *Liver mass [R16.0]   • ARF (acute renal failure) (HCC) [N17.9]   • Transaminitis [R74.0]   • Liver cirrhosis (HCC) [K74.60]   • Coagulopathy (HCC) [D68.9]   • Increased anion gap metabolic acidosis [E87.2]   • Hyponatremia [E87.1]     1. ANAHI - Worse, suspect HRS  2. Liver mass, likely malignant and no tx available  3. Confusion - ? Hepatic encephalopathy  4. Hyperphosphatemia     - I discussed with the  extensively regarding the patient's condition. She is not a candidate for dialysis given that it would likely be of no benefit in terms of quality or length of life given her condition.   -Given this, after discussion and answering any questions, the code status of the patient has been changed to DNR  -Furthermore, I discussed the need for hospice care in this case, and he is agreeable to discuss. Still has some questions regarding what is involved in hospice care which would best be discussed with that service.    ---    ADDENDUM  A few minutes after this discussion this and during the composition of this note,  fell and code was called, sent to ER for evaluation.    Quality-Core Measures

## 2018-04-28 NOTE — PROGRESS NOTES
[facility] Hospitalist Progress Note    Date of Service: 2018    Chief Complaint  72 y.o. female admitted 2018 with jaundice. Found have a liver mass. Also, has severe renal failure    Interval Problem Update  Significant change from yesterday as she is very confused and lethargic today and not following commands  No events overnight  Minimal urinary output    Consultants/Specialty  Nephrology    Disposition  Patient is critically ill. I had an extensive discussion with the patient's  at bedside today. We discussed her medical condition and prognosis. We had a discussion for approximately 45 minutes. At this point the  is considering palliative care versus hospice care. I did discuss with him what I discussed with her to another oncologist in how she is not a candidate for surgery nor chemotherapy. This also appears to be rapidly progressing malignancy with likely hepatorenal syndrome for which the prognosis is extremely poor.  is okay with DNR status at this point. He is considering comfort care measures but does not want to do so just yet. He would like to give another day and fluids. At that point he will consider inpatient hospice versus comfort care. Currently she is a DNR code status. Should she decline we will not escalate therapy.        Review of Systems   Unable to perform ROS: Medical condition      Physical Exam  Laboratory/Imaging   Hemodynamics  Temp (24hrs), Av.3 °C (97.4 °F), Min:36.2 °C (97.1 °F), Max:36.6 °C (97.8 °F)   Temperature: 36.4 °C (97.5 °F)  Pulse  Av  Min: 75  Max: 85    Blood Pressure : 114/49 (rn aware)      Respiratory      Respiration: 20             Fluids    Intake/Output Summary (Last 24 hours) at 18 1524  Last data filed at 18 1303   Gross per 24 hour   Intake              200 ml   Output                0 ml   Net              200 ml       Nutrition  Orders Placed This Encounter   Procedures   • Diet Order     Standing Status:    Standing     Number of Occurrences:   1     Order Specific Question:   Diet:     Answer:   Regular [1]     Physical Exam   Constitutional: She appears lethargic. She appears toxic. No distress.   HENT:   Head: Normocephalic and atraumatic.   Eyes: Conjunctivae are normal. Pupils are equal, round, and reactive to light. Scleral icterus is present.   Neck: Normal range of motion. Neck supple.   Cardiovascular: Normal rate and regular rhythm.    Pulmonary/Chest: Effort normal. No respiratory distress. She has decreased breath sounds in the right lower field and the left lower field.   Abdominal: Soft. Bowel sounds are normal. She exhibits distension. There is tenderness.   Musculoskeletal: She exhibits edema. She exhibits no tenderness.   Neurological: She appears lethargic. She is disoriented.   Skin: Skin is dry. She is not diaphoretic.   Severe jaundice   Nursing note and vitals reviewed.      Recent Labs      04/27/18   0851  04/28/18   0511   WBC  21.0*  21.6*   RBC  5.72*  5.54*   HEMOGLOBIN  15.6  15.1   HEMATOCRIT  43.9  43.2   MCV  76.7*  78.0*   MCH  27.3  27.3   MCHC  35.5*  35.0   RDW  51.8*  53.3*   PLATELETCT  356  339   MPV  9.2  9.5     Recent Labs      04/27/18   0851  04/28/18   0511   SODIUM  119*  120*   POTASSIUM  3.8  3.6   CHLORIDE  87*  89*   CO2  12*  11*   GLUCOSE  173*  168*   BUN  79*  87*   CREATININE  6.25*  6.92*   CALCIUM  7.9*  7.1*     Recent Labs      04/27/18   0851   INR  2.48*                  Assessment/Plan     * Liver mass   Assessment & Plan    Quite large  Consistent with cholangiocarcinoma  I did speak with Dr. EDOUARD with surgical oncology who does not feel that this she is a surgical candidate; he suggested hospice  I also spoke with Dr. Caballero with oncology who reports that she is not a candidate for radiation or chemotherapy. He also recommended hospice care  Both oncologist did suggest that this was consistent with a cholangiocarcinoma and less likely hepatocellular  carcinoma        Encephalopathy acute   Assessment & Plan    Likely hepatic encephalopathy versus metabolic  Condition deteriorating rapidly        Coagulopathy (HCC)   Assessment & Plan    Secondary to liver failure        Liver cirrhosis (HCC)   Assessment & Plan    May be related to the liver mass  She also took quite a bit of amoxicillin approximately a week ago which may have worsened her liver function          Transaminitis   Assessment & Plan    Secondary to above        ARF (acute renal failure) (HCC)   Assessment & Plan    Nephrology following alongside  Consistent with hepatorenal syndrome  Continue with IV fluids  Recheck labs in the morning        Patient is critically ill. Total critical care time approximately 35 minutes exceeding procedures and with no overlap. This also exclude the time spent with the patient's family regarding code status. Her prognosis is extremely poor. We will continue with IV fluids.   Quality-Core Measures   Reviewed items::  Radiology images reviewed, Medications reviewed and Labs reviewed  DVT prophylaxis pharmacological::  Contraindicated - High bleeding risk

## 2018-04-29 NOTE — PROGRESS NOTES
Hospital Medicine Progress Note, Adult, Complex               Author: Richie Trejo Date & Time created: 4/29/2018  11:23 AM     Interval History:  72 year old with jaundice, ANAHI and new liver mass. Renal function worse, Cr up to 8. Anuric. Encephalopathic.     Review of Systems:  Review of Systems   Unable to perform ROS: Mental acuity       Physical Exam:  Physical Exam   Constitutional: She appears ill.   Jaundiced   HENT:   Head: Normocephalic and atraumatic.   Cardiovascular: Normal rate and regular rhythm.    Pulmonary/Chest: Effort normal and breath sounds normal.   Abdominal: She exhibits distension. There is no tenderness.   Musculoskeletal: She exhibits no edema or tenderness.       Labs:        Invalid input(s): [id number]      Recent Labs      04/27/18   0851 04/28/18 0511 04/29/18 0522   SODIUM  119*  120*  121*   POTASSIUM  3.8  3.6  4.4   CHLORIDE  87*  89*  90*   CO2  12*  11*  11*   BUN  79*  87*  102*   CREATININE  6.25*  6.92*  7.96*   MAGNESIUM   --   2.3   --    PHOSPHORUS   --   11.1*   --    CALCIUM  7.9*  7.1*  7.2*     Recent Labs      04/27/18   0851 04/28/18 0511 04/29/18 0522   ALTSGPT  53*  63*  74*   ASTSGOT  138*  151*  185*   ALKPHOSPHAT  485*  498*  548*   TBILIRUBIN  27.5*  27.2*  25.7*   LIPASE  77*   --    --    GLUCOSE  173*  168*  137*     Recent Labs      04/27/18   0851  04/28/18 0511 04/29/18 0522   RBC  5.72*  5.54*  5.45*   HEMOGLOBIN  15.6  15.1  14.6   HEMATOCRIT  43.9  43.2  42.1   PLATELETCT  356  339  335   PROTHROMBTM  26.5*   --    --    INR  2.48*   --    --      Recent Labs      04/27/18   0851  04/28/18 0511 04/29/18 0522   WBC  21.0*  21.6*  24.9*   NEUTSPOLYS  85.80*  84.70*  85.10*   LYMPHOCYTES  8.20*  8.20*  7.80*   MONOCYTES  4.40  5.10  4.30   EOSINOPHILS  0.10  0.10  0.00   BASOPHILS  0.10  0.20  0.20   ASTSGOT  138*  151*  185*   ALTSGPT  53*  63*  74*   ALKPHOSPHAT  485*  498*  548*   TBILIRUBIN  27.5*  27.2*  25.7*            Hemodynamics:  Temp (24hrs), Av.4 °C (97.5 °F), Min:36.2 °C (97.2 °F), Max:36.5 °C (97.7 °F)  Temperature: 36.2 °C (97.2 °F)  Pulse  Av.2  Min: 43  Max: 85   Blood Pressure : (!) 93/51 (Rn aware.)     Respiratory:    Respiration: 18           Fluids:    Intake/Output Summary (Last 24 hours) at 18 1123  Last data filed at 18 1303   Gross per 24 hour   Intake              200 ml   Output                0 ml   Net              200 ml        GI/Nutrition:  Orders Placed This Encounter   Procedures   • Diet Order     Standing Status:   Standing     Number of Occurrences:   1     Order Specific Question:   Diet:     Answer:   Regular [1]     Medical Decision Making, by Problem:  Active Hospital Problems    Diagnosis   • *Liver mass [R16.0]   • Encephalopathy acute [G93.40]   • ARF (acute renal failure) (HCC) [N17.9]   • Transaminitis [R74.0]   • Liver cirrhosis (HCC) [K74.60]   • Coagulopathy (HCC) [D68.9]   • Increased anion gap metabolic acidosis [E87.2]   • Hyponatremia [E87.1]     1. ANAHI - Worse, from HRS. Not expected to improve. Not a candidate for RRT.  2. Liver mass - suspicion of cholangiocarcinoma  3. Hyponatremia - Na 121, from liver failure  4. Acidosis   5. Jaundice    -Discussed again with the family, Dr. Bob  -Not a candidate for RRT due to lack of benefit, high risk of complications  -Questions answered  -Will sign off as there is nothing more we can offer  -Recommend comfort care    Quality-Core Measures

## 2018-04-29 NOTE — CONSULTS
DATE OF SERVICE:  04/29/2018    REFERRING PHYSICIAN:  Dr. Brody Bob    CHIEF COMPLAINT:  Jaundice.    HISTORY OF PRESENT ILLNESS:  This patient is a 72-year-old white female who   was in her usual state of health until 1 week ago when she developed general   malaise and fatigue and then became jaundiced.  She then became somewhat   confused with balance issues and was brought into the emergency room and found   to be significantly jaundiced.  She was also found to be in renal failure.    The history was provided at least per the patient by the .  He states   she has had no abdominal pain or any other GI symptoms such as nausea,   vomiting, heartburn, change in bowel habits, or overt GI bleeding.  He states   her weight has been stable.  She has had an ultrasound showing a mass in the   left lobe of her liver as well as a noncontrast CT scan showing potential   masses in both lobes of the liver and ductal dilatation and markedly abnormal   liver function tests.    ALLERGIES:  No known allergies.    MEDICATIONS:  As an outpatient, she was not on any medications.    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    SOCIAL HISTORY:  She is , does not smoke or drink alcohol.    FAMILY HISTORY:  Strong history of osteosarcoma.    REVIEW OF SYSTEMS:  Not obtainable.    PHYSICAL EXAMINATION:  VITAL SIGNS:  Blood pressure is 90/60, pulse is 90, respiratory rate is 20,   and temperature is afebrile.  SKIN:  Jaundiced.  HEENT:  Icteric.  NECK:  Supple.  LYMPH NODES:  Negative supraclavicular or cervical and axillary.  CARDIOVASCULAR:  Regular rate and rhythm.  LUNGS:  Trace bibasilar rales.  ABDOMEN:  Moderately distended with a fluid wave, which is nontender.  EXTREMITIES:  1+ pedal edema.  NEUROLOGIC:  She is lethargic and cannot answer questions.    LABORATORY DATA:  Sodium 42.1, white blood cell count 24.9, platelet count   335,000.  Sodium is 121,  potassium is 4.4, creatinine is 7.96, INR is 2.48.    Alpha fetoprotein for hepatitis A, B, C serologies are negative.  AST is 185,   ALT is 548, bilirubin is 25.7, alkaline phosphatase 548.    IMAGING:  CT scan shows bilateral liver masses with cirrhosis and intrahepatic   ductal dilatation along with splenomegaly, ascites, and enlarged periportal   and pericaval lymph nodes.    IMPRESSION AND RECOMMENDATIONS:  1.  Abnormal liver function tests.  It appears that this patient has cancer as   the cause.  We could be looking at a cholangiocarcinoma with liver metastasis   and she does have enlarged lymph nodes.  Given her renal failure and the   extensive involvement in both lobes of her liver as well as lymph node   involvement, she is not going to benefit from any surgery or other modalities.    This appears to be a terminal process and I had a long conversation with the    in that regard.  They are very interested in involving hospice or   palliative care and the patient has been made a no code.  Given comfort care   is the plan and there are no active interventions, we will sign off at this   time.  2.  Liver mass.  She has extensive involvement in her liver as well as lymph   node involvement and this is not amenable to any therapy.  3.  Acute kidney injury.  She is now in renal failure.  4.  Cirrhosis unsure of the underlying cause.  5.  Ascites.  This could be due to portal hypertension or potentially   malignancy or a combination of the two.  6.  Coagulopathy, most likely due to her liver disease.  7.  Hyponatremia, most likely due to her renal failure and liver disease.       ____________________________________     MD JHONATAN Woods / BILLY    DD:  04/29/2018 13:32:32  DT:  04/29/2018 16:21:56    D#:  [id number]  Job#:  [id number]

## 2018-04-29 NOTE — PROGRESS NOTES
Initial assessments done, no new observations noted, in bed resting, pt lethargic, unable to answer questions properly, no signs of pain.

## 2018-04-29 NOTE — PROGRESS NOTES
[facility] Hospitalist Progress Note    Date of Service: 2018    Chief Complaint  72 y.o. female admitted 2018 with jaundice. Found have a liver mass. Also, has severe renal failure    Interval Problem Update  Mental status appears to be worse today  Not following any commands  Somnolence/obtunded  Minimal urinary output  Nephrology signed off    Consultants/Specialty  Nephrology signed off  Gastroenterology consulted    Disposition  Patient is critically ill. I had an extensive discussion with the patient's  at bedside today. We discussed her medical condition and prognosis. We had a discussion for approximately 45 minutes. At this point the  is considering palliative care versus hospice care. I did discuss with him what I discussed with her to another oncologist in how she is not a candidate for surgery nor chemotherapy. This also appears to be rapidly progressing malignancy with likely hepatorenal syndrome for which the prognosis is extremely poor.  is okay with DNR status at this point. He is considering comfort care measures but does not want to do so just yet. He would like to give another day and fluids. At that point he will consider inpatient hospice versus comfort care but only after he discusses this with gastroenterology and possibly another oncologist tomorrow. Currently she is a DNR code status. Should she decline we will not escalate therapy.        Review of Systems   Unable to perform ROS: Medical condition      Physical Exam  Laboratory/Imaging   Hemodynamics  Temp (24hrs), Av.4 °C (97.5 °F), Min:36.2 °C (97.2 °F), Max:36.5 °C (97.7 °F)   Temperature: 36.2 °C (97.2 °F)  Pulse  Av.2  Min: 43  Max: 85    Blood Pressure : (!) 93/51 (Rn aware.)      Respiratory      Respiration: 18             Fluids    Intake/Output Summary (Last 24 hours) at 18 1128  Last data filed at 18 1303   Gross per 24 hour   Intake              200 ml   Output                0 ml    Net              200 ml       Nutrition  Orders Placed This Encounter   Procedures   • Diet Order     Standing Status:   Standing     Number of Occurrences:   1     Order Specific Question:   Diet:     Answer:   Regular [1]     Physical Exam   Constitutional: She appears lethargic. She appears toxic. No distress.   HENT:   Head: Normocephalic and atraumatic.   Eyes: Conjunctivae are normal. Pupils are equal, round, and reactive to light.   Neck: Normal range of motion. Neck supple.   Cardiovascular: Normal rate and regular rhythm.    Pulmonary/Chest: Effort normal. No respiratory distress. She has decreased breath sounds in the right lower field and the left lower field.   Abdominal: Soft. Bowel sounds are normal. She exhibits distension. There is tenderness.   Musculoskeletal: She exhibits edema. She exhibits no tenderness.   Neurological: She appears lethargic. She is disoriented.   Skin: Skin is dry. She is not diaphoretic.   Severe jaundice   Nursing note and vitals reviewed.      Recent Labs      04/27/18   0851  04/28/18   0511  04/29/18   0522   WBC  21.0*  21.6*  24.9*   RBC  5.72*  5.54*  5.45*   HEMOGLOBIN  15.6  15.1  14.6   HEMATOCRIT  43.9  43.2  42.1   MCV  76.7*  78.0*  77.2*   MCH  27.3  27.3  26.8*   MCHC  35.5*  35.0  34.7   RDW  51.8*  53.3*  54.0*   PLATELETCT  356  339  335   MPV  9.2  9.5  9.4     Recent Labs      04/27/18   0851  04/28/18   0511  04/29/18   0522   SODIUM  119*  120*  121*   POTASSIUM  3.8  3.6  4.4   CHLORIDE  87*  89*  90*   CO2  12*  11*  11*   GLUCOSE  173*  168*  137*   BUN  79*  87*  102*   CREATININE  6.25*  6.92*  7.96*   CALCIUM  7.9*  7.1*  7.2*     Recent Labs      04/27/18   0851   INR  2.48*                  Assessment/Plan     * Liver mass   Assessment & Plan    Quite large  Consistent with cholangiocarcinoma  I did speak with Dr. EDOUARD with surgical oncology who does not feel that this she is a surgical candidate; he suggested hospice  I also spoke with Dr. Caballero  with oncology who reports that she is not a candidate for radiation or chemotherapy. He also recommended hospice care  Both oncologist did suggest that this was consistent with a cholangiocarcinoma and less likely hepatocellular carcinoma        Encephalopathy acute   Assessment & Plan    Likely hepatic encephalopathy versus metabolic  Condition deteriorating rapidly        Hyponatremia   Assessment & Plan    Not much improvement with IV fluids  Nephrology following        Coagulopathy (HCC)   Assessment & Plan    Secondary to liver failure        Liver cirrhosis (HCC)   Assessment & Plan    May be related to the liver mass  She also took quite a bit of amoxicillin approximately a week ago which may have worsened her liver function  Appears to have fulminant hepatic failure  Gastroenterology consultant          Transaminitis   Assessment & Plan    Secondary to above        ARF (acute renal failure) (HCC)   Assessment & Plan    Nephrology following alongside  Consistent with hepatorenal syndrome  Continue with IV fluids  Minimal output  Renal function worsening           Quality-Core Measures   Reviewed items::  Radiology images reviewed, Medications reviewed and Labs reviewed  DVT prophylaxis pharmacological::  Contraindicated - High bleeding risk

## 2018-04-29 NOTE — PROGRESS NOTES
Report received from night shift RN (Jason). Discussed plan of care, assumed care of patient. Safety measures in place.

## 2018-04-29 NOTE — PROGRESS NOTES
Received bedside report from conyhimary Navarrete. Plan of care discussed. Safety measures in place. Pt resting in bed, no complaints as of this time.

## 2018-04-30 NOTE — PROGRESS NOTES
While checking patient and giving report, patient found to have no heart rate, and no respirations. Call placed to , left message. Call MD, paged.

## 2018-04-30 NOTE — PROGRESS NOTES
Patient has no urinary output from the last eight hours. Patient cleaned and repositioned, with no response.

## 2018-04-30 NOTE — DISCHARGE SUMMARY
Death summary  Cause of death  #1 hepatorenal syndrome  #2 fulminant hepatic failure  #3 liver mass consistent with cholangiocarcinoma  #4 renal failure  #5 encephalopathy          CHIEF COMPLAINT ON ADMISSION  Chief Complaint   Patient presents with   • Jaundice     since today, advised by [facility]   • Shortness of Breath     since today, no chest pain   • UTI     resolved per pt history, took Fish Amoxil for 1 week       CODE STATUS  Prior    HPI & HOSPITAL COURSE  This is a 72 y.o. female here with jaundice. Patient was in her usual state of health when she developed fatigue shortness of breath and decreased appetite. When she presented to Hospital she was found to be extremely jaundiced with a total bilirubin of greater than 27. She's never had GI symptoms before. In the emergency department imaging revealed a large liver mass consistent with possible cholangiocarcinoma.    In the emergency department I did discuss further plan of care with the patient and her . I called surgical oncology who did not feel that this mass and the patient was a surgical candidate. It is recommended that I also consult oncology but because of the patient's labs and because of the size of the mass hospice is recommended. I proceeded to consult oncology at [facility]. We went over the labs and the findings and oncology did not feel that she is a candidate for chemotherapy or radiation therapy based on the size of the mass location of the mass as well as the labs. I discussed this with the patient and she did not want to be transferred to [facility]. She wanted to be treated for IV fluids to see if her renal function improved.    Over the course of the hospitalization her condition deteriorated. When she arrived she was found to have significant renal failure and we treated her with IV fluids and a consult to nephrology. Over the course of the hospitalization she had minimal urinary output and her renal function continued  to worsen despite IV fluids. Nephrology did not feel that there was anything more that could be done. I discussed this with the patient's  at bedside. He was okay with continuing with IV fluids.    By the following morning the patient's mental status had declined. She is extremely confused and lethargic. Anteroposterior course her mental status continued to worsen.    Her liver enzymes fluctuated but remained stable. It appeared that she had fulminant hepatic failure. I consulted gastroenterology who evaluated the patient. Gastroenterology as well as the oncologist felt that this was consistent with a cholangiocarcinoma.    We had multiple discussions with the patient's  regarding code status. Initially she was full code and the  and the following morning change her to DNR after discussion with nephrology. This is because of worsening renal function as well as mental status change. I also had multiple discussions regarding possible palliative versus hospice and following day has been was considering hospice care. Plan was not to escalate therapy.    Patient  on  at approximately 1900. She was found to be unresponsive when the nurse checked on her. There is no respirations or carotid pulse or heart rate.    The patient met 2-midnight criteria for an inpatient stay at the time of discharge.    Therefore, she is discharged in guarded and stable condition with close outpatient follow-up.        DISCHARGE PROBLEM LIST  Principal Problem:    Liver mass POA: Unknown  Active Problems:    ARF (acute renal failure) (HCC) POA: Unknown    Transaminitis POA: Unknown    Liver cirrhosis (HCC) POA: Unknown    Coagulopathy (HCC) POA: Unknown    Increased anion gap metabolic acidosis POA: Unknown    Hyponatremia POA: Unknown    Encephalopathy acute POA: Unknown  Resolved Problems:    * No resolved hospital problems. *      FOLLOW UP  No future appointments.  Primary Care Provider    Schedule an  appointment as soon as possible for a visit in 1 week  Hospital follow-up appointment with PCP      MEDICATIONS ON DISCHARGE   Andrae Andrew   Home Medication Instructions LORETO:[id number]    Printed on:04/30/18 0940   Medication Information                      amoxicillin (AMOXIL) 250 MG Cap  Take 250 mg by mouth 3 times a day. FISH AMOX   Patient took 12 capsules in 1 day on 4-25-18             aspirin EC (ECOTRIN) 81 MG Tablet Delayed Response  Take 81 mg by mouth every day.                 DIET  No orders of the defined types were placed in this encounter.        CONSULTATIONS  Nephrology  Gastroenterology      LABORATORY  Lab Results   Component Value Date/Time    SODIUM 121 (L) 04/29/2018 05:22 AM    POTASSIUM 4.4 04/29/2018 05:22 AM    CHLORIDE 90 (L) 04/29/2018 05:22 AM    CO2 11 (L) 04/29/2018 05:22 AM    GLUCOSE 137 (H) 04/29/2018 05:22 AM     (HH) 04/29/2018 05:22 AM    CREATININE 7.96 (HH) 04/29/2018 05:22 AM        Lab Results   Component Value Date/Time    WBC 24.9 (H) 04/29/2018 05:22 AM    HEMOGLOBIN 14.6 04/29/2018 05:22 AM    HEMATOCRIT 42.1 04/29/2018 05:22 AM    PLATELETCT 335 04/29/2018 05:22 AM        Total time of the discharge process exceeds 45 minutes

## 2018-04-30 NOTE — PROGRESS NOTES
Pt's  arrives, talked to him and taked to the room. He states he does not have a Mortuary of preference and it is OK to call the Mortuary on call. Will contact [employer].

## 2018-04-30 NOTE — PROGRESS NOTES
Talked to malou De Los Santos again just to confirm that it is ok to take the bridges catheter out and if we need to do any special care to the Pt. She states it is ok to take out the bridges and aplay drops of normal saline to the eyes and place Ice to the nose bridge.  Done.

## 2018-04-30 NOTE — PROGRESS NOTES
Doctor Bob returns page, notified MD of patient's passing. Doctor will call , and ask that we wait for . Doctor will also attempt to find 's choice of mortuary.

## 2018-04-30 NOTE — PROGRESS NOTES
Diony from [facility] called back, she states Pt might be a candidate for organ donation and that she will contact the pt's  and will call me back.

## 2018-04-30 NOTE — PROGRESS NOTES
While getting report from Aria NEELY, day shift nurse, went to check on the pt. Pt was unresponsive. No respirations, carotid pulse, or heart rate. Pt  and Pt's  Contacted by Ursula.

## 2018-04-30 NOTE — PROGRESS NOTES
Tali Chen from the [facility] called. After the information received she states this Pt is not a coroners case.
